# Patient Record
Sex: MALE | Race: ASIAN
[De-identification: names, ages, dates, MRNs, and addresses within clinical notes are randomized per-mention and may not be internally consistent; named-entity substitution may affect disease eponyms.]

---

## 2019-01-22 ENCOUNTER — HOSPITAL ENCOUNTER (EMERGENCY)
Dept: HOSPITAL 72 - EMR | Age: 29
Discharge: HOME | End: 2019-01-22
Payer: COMMERCIAL

## 2019-01-22 VITALS — DIASTOLIC BLOOD PRESSURE: 73 MMHG | SYSTOLIC BLOOD PRESSURE: 114 MMHG

## 2019-01-22 VITALS — BODY MASS INDEX: 17.9 KG/M2 | HEIGHT: 70 IN | WEIGHT: 125 LBS

## 2019-01-22 VITALS — DIASTOLIC BLOOD PRESSURE: 74 MMHG | SYSTOLIC BLOOD PRESSURE: 120 MMHG

## 2019-01-22 VITALS — DIASTOLIC BLOOD PRESSURE: 63 MMHG | SYSTOLIC BLOOD PRESSURE: 111 MMHG

## 2019-01-22 DIAGNOSIS — R11.2: ICD-10-CM

## 2019-01-22 DIAGNOSIS — R42: Primary | ICD-10-CM

## 2019-01-22 DIAGNOSIS — J45.909: ICD-10-CM

## 2019-01-22 DIAGNOSIS — R51: ICD-10-CM

## 2019-01-22 LAB
ADD MANUAL DIFF: YES
ANION GAP SERPL CALC-SCNC: 11 MMOL/L (ref 5–15)
BUN SERPL-MCNC: 19 MG/DL (ref 7–18)
CALCIUM SERPL-MCNC: 9.1 MG/DL (ref 8.5–10.1)
CHLORIDE SERPL-SCNC: 102 MMOL/L (ref 98–107)
CO2 SERPL-SCNC: 25 MMOL/L (ref 21–32)
CREAT SERPL-MCNC: 0.9 MG/DL (ref 0.55–1.3)
ERYTHROCYTE [DISTWIDTH] IN BLOOD BY AUTOMATED COUNT: 11.6 % (ref 11.6–14.8)
HCT VFR BLD CALC: 49.6 % (ref 42–52)
HGB BLD-MCNC: 16.8 G/DL (ref 14.2–18)
MCV RBC AUTO: 95 FL (ref 80–99)
PLATELET # BLD: 172 K/UL (ref 150–450)
POTASSIUM SERPL-SCNC: 4.3 MMOL/L (ref 3.5–5.1)
RBC # BLD AUTO: 5.2 M/UL (ref 4.7–6.1)
SODIUM SERPL-SCNC: 138 MMOL/L (ref 136–145)
WBC # BLD AUTO: 9 K/UL (ref 4.8–10.8)

## 2019-01-22 PROCEDURE — 70450 CT HEAD/BRAIN W/O DYE: CPT

## 2019-01-22 PROCEDURE — 99284 EMERGENCY DEPT VISIT MOD MDM: CPT

## 2019-01-22 PROCEDURE — 85025 COMPLETE CBC W/AUTO DIFF WBC: CPT

## 2019-01-22 PROCEDURE — 36415 COLL VENOUS BLD VENIPUNCTURE: CPT

## 2019-01-22 PROCEDURE — 80048 BASIC METABOLIC PNL TOTAL CA: CPT

## 2019-01-22 PROCEDURE — 85007 BL SMEAR W/DIFF WBC COUNT: CPT

## 2019-01-22 PROCEDURE — 96374 THER/PROPH/DIAG INJ IV PUSH: CPT

## 2019-01-22 NOTE — NUR
ED Nurse Note:



per pt, he has been feeling dizziness, n/v, since this 0700. no active 
vomiting. per pt headache is present 7/10. pt denies taking medication for n/v 
and headache.

## 2019-01-22 NOTE — EMERGENCY ROOM REPORT
History of Present Illness


General


Chief Complaint:  Dizziness


Source:  Patient





Present Illness


HPI


Patient presents with an episode of acute dizziness that occurred this morning 

he reports that he has stood up when he felt like he was getting dragged to the 

floor patient also reports that he has had associated headache nausea vomiting 

with this


Denies any chest pain or shortness of breath


Denies any fevers denies any neck pain or photophobia patient reports that he 

has a family history of aneurysm and was extremely concerned


Denies any focal weakness denies any recent trauma


Allergies:  


Coded Allergies:  


     No Known Allergies (Unverified , 1/22/19)





Patient History


Past Medical History:  see triage record


Pertinent Family History:  none


Reviewed Nursing Documentation:  PMH: Agreed; PSxH: Agreed





Nursing Documentation-PMH


Past Medical History:  No History, Except For


Hx Asthma:  Yes





Review of Systems


All Other Systems:  negative except mentioned in HPI





Physical Exam





Vital Signs








  Date Time  Temp Pulse Resp B/P (MAP) Pulse Ox O2 Delivery O2 Flow Rate FiO2


 


1/22/19 10:24 97.9 70 16 114/73 100 Room Air  








Sp02 EP Interpretation:  reviewed, normal


General Appearance:  well appearing, no apparent distress


Head:  normocephalic, atraumatic


Eyes:  bilateral eye PERRL, bilateral eye EOMI


ENT:  hearing grossly normal, normal pharynx, TMs + canals normal, uvula midline


Neck:  full range of motion, supple, no meningismus, no bony tend


Respiratory:  lungs clear, normal breath sounds, no rhonchi, no respiratory 

distress, no retraction, no accessory muscle use


Cardiovascular #1:  normal peripheral pulses, regular rate, rhythm, no edema, 

no gallop, no JVD, no murmur


Gastrointestinal:  normal bowel sounds, non tender, soft, no mass, no 

organomegaly, non-distended, no guarding, no hernia, no pulsatile mass, no 

rebound


Genitourinary:  no CVA tenderness


Musculoskeletal:  normal inspection


Neurologic:  oriented x3, responsive, CNs III-XII nml as tested, motor strength/

tone normal, sensory intact


Psychiatric:  mood/affect normal


Skin:  normal color, no rash, warm/dry, palpation normal


Lymphatic:  normal inspection, no adenopathy





Medical Decision Making


Diagnostic Impression:  


 Primary Impression:  


 Dizziness


 Additional Impression:  


 Headache


ER Course


Given the patient's history and presentation


Patient is complex with multiple differentials


Extensive blood work and imaging is initiated given the patient's concerns of 

ruptured aneurysm


CT head however was negative


Blood work is appropriate patient's findings appear to be more consistent with


Vertigo and patient will have continued outpatient evaluation and return with 

any changes


cbc normal


chem  normal


Rhythm Strip Diag. Results


EP Interpretation:  yes


Rate:  60


Rhythm:  NSR, no PVC's, no ectopy





CT/MRI/US Diagnostic Results


CT/MRI/US Diagnostic Results :  


   Impression


CT head no acute disease





Last Vital Signs








  Date Time  Temp Pulse Resp B/P (MAP) Pulse Ox O2 Delivery O2 Flow Rate FiO2


 


1/22/19 10:24 97.9 70 16 114/73 100 Room Air  








Status:  improved


Disposition:  HOME, SELF-CARE


Condition:  Improved


Scripts


Meclizine Hcl* (MECLIZINE*) 25 Mg Tablet


25 MG ORAL THREE TIMES A DAY, #12 TAB


   Prov: Deborah Blandon DO         1/22/19





Additional Instructions:  


Patient is provided with the discharge instructions notified to follow up with 

primary doctor in the next 2-3 days otherwise return to the er with any 

worsening symptoms.


Please note that this report is being documented using DRAGON technology.  This 

can lead to erroneous entry secondary to incorrect interpretation by the 

dictating instrument.











Deborah Blandon DO Jan 22, 2019 10:34

## 2019-01-22 NOTE — NUR
ED Nurse Note:



Discharge instructions given to pt. Answered all questions. Verbalized 
understanding. No acute distress noted. ID band and IV site removed. Left ER w/ 
steady gait and all belongings.

## 2019-01-22 NOTE — DIAGNOSTIC IMAGING REPORT
Indications: Nausea, vomiting, acute onset dizziness

 

Technique: Spiral acquisitions obtained through the brain. Angled axial and coronal 5

x 5 mm slices were reconstructed. Total dose length product 1407.76 mGycm.  CTDI

vol(s) 70.38 mGy. Dose reduction achieved using automated exposure control

 

Comparison: None.

 

Findings: No acute intracranial hemorrhage nor edema. No mass effect nor midline

shift. Normal gray-white differentiation. Normal-sized ventricles and extra-axial CSF

spaces. Visualized orbits and sinuses are unremarkable. Intact calvarium. The

mastoids are clear

 

Impression: Negative

 

 

 

 

 

The CT scanner at [facility] is accredited by the [employer] College of

Radiology and the scans are performed using protocols designed to limit radiation

exposure to as low as reasonably achievable to attain images of sufficient resolution

adequate for diagnostic evaluation.